# Patient Record
Sex: MALE | Race: OTHER | HISPANIC OR LATINO | Employment: UNEMPLOYED | URBAN - METROPOLITAN AREA
[De-identification: names, ages, dates, MRNs, and addresses within clinical notes are randomized per-mention and may not be internally consistent; named-entity substitution may affect disease eponyms.]

---

## 2023-10-18 ENCOUNTER — HOSPITAL ENCOUNTER (EMERGENCY)
Facility: HOSPITAL | Age: 48
Discharge: HOME | End: 2023-10-18
Attending: EMERGENCY MEDICINE

## 2023-10-18 VITALS
HEART RATE: 70 BPM | RESPIRATION RATE: 18 BRPM | HEIGHT: 66 IN | WEIGHT: 119.06 LBS | DIASTOLIC BLOOD PRESSURE: 67 MMHG | TEMPERATURE: 99 F | BODY MASS INDEX: 19.13 KG/M2 | OXYGEN SATURATION: 99 % | SYSTOLIC BLOOD PRESSURE: 116 MMHG
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
## 2023-10-18 DIAGNOSIS — S50.01XA CONTUSION OF RIGHT ELBOW, INITIAL ENCOUNTER: Primary | ICD-10-CM

## 2023-10-18 PROCEDURE — 99283 EMERGENCY DEPT VISIT LOW MDM: CPT

## 2023-10-18 RX ORDER — NAPROXEN 500 MG/1
500 TABLET ORAL 2 TIMES DAILY WITH MEALS
Qty: 60 TABLET | Refills: 0 | Status: SHIPPED | OUTPATIENT
Start: 2023-10-18

## 2023-10-18 RX ORDER — NAPROXEN 500 MG/1
500 TABLET ORAL 2 TIMES DAILY WITH MEALS
Qty: 60 TABLET | Refills: 0 | Status: SHIPPED | OUTPATIENT
Start: 2023-10-18 | End: 2023-10-18 | Stop reason: SDUPTHER

## 2023-10-18 NOTE — DISCHARGE INSTRUCTIONS

## 2023-10-18 NOTE — Clinical Note
"Vinnie Davistevin Pedro was seen and treated in our emergency department on 10/18/2023.  He may return to work on 10/20/2023.       If you have any questions or concerns, please don't hesitate to call.      Avila Tyson PA-C"

## 2023-10-18 NOTE — ED PROVIDER NOTES
Encounter Date: 10/18/2023       History     Chief Complaint   Patient presents with    Fall     Pt was coming down stairs and fell landing on his right elbow. Pt has continued with pain. Pt has an elastic wrap in place. Decreased ROM and tender to touch.      48 year old male presents to ED with concern of right elbow pain after he stumbled while walking down stairs causing contusion to lateral right elbow.  Pain to site is sharp, worse with touch or movement, severity rated had 9/10.  No numbness or focal weakness or other injuries reported from fall.  No other acute complaints at this time.    The history is provided by the patient.     Review of patient's allergies indicates:  No Known Allergies  No past medical history on file.  No past surgical history on file.  No family history on file.     Review of Systems   Musculoskeletal:  Positive for arthralgias.   Skin:  Negative for wound.   Neurological:  Negative for weakness and numbness.       Physical Exam     Initial Vitals [10/18/23 1532]   BP Pulse Resp Temp SpO2   116/67 70 18 98.9 °F (37.2 °C) 99 %      MAP       --         Physical Exam    Nursing note and vitals reviewed.  Constitutional: He appears well-developed and well-nourished. He is active. He does not have a sickly appearance. He does not appear ill. No distress.   HENT:   Head: Normocephalic and atraumatic.   Neck:   Normal range of motion.  Musculoskeletal:      Cervical back: Normal range of motion.      Comments: Right elbow tenderness over lateral malleolus.  No obvious physical or palpable deformities.  No significant swelling.  Tenderness does extend to dorsal right forearm.  Pain does worse with wrist extension against resistance.  Radial pulse intact.  Distal sensation intact.     Neurological: He is alert. GCS eye subscore is 4. GCS verbal subscore is 5. GCS motor subscore is 6.   Skin: Skin is warm and dry.   Psychiatric: He has a normal mood and affect. His speech is normal and  behavior is normal.         ED Course   Procedures  Labs Reviewed - No data to display       Imaging Results              X-Ray Forearm Right (Final result)  Result time 10/18/23 16:18:19      Final result by Robin Carpenter MD (10/18/23 16:18:19)                   Impression:      No acute displaced fracture-dislocation identified.      Electronically signed by: Robin Carpenter MD  Date:    10/18/2023  Time:    16:18               Narrative:    EXAMINATION:  XR ELBOW COMPLETE 3 VIEW RIGHT; XR FOREARM RIGHT    CLINICAL HISTORY:  . Unspecified fall, initial encounter    TECHNIQUE:  AP, lateral, and oblique views of the right elbow; AP and lateral views right forearm    COMPARISON:  None    FINDINGS:  Bones are well mineralized.  Slight ulnar minus variance.  No displaced fracture, dislocation or destructive osseous process.  No large elbow joint effusion.  Joint spaces appear relatively maintained.  No subcutaneous emphysema or radiodense retained foreign body.                                       X-Ray Elbow Complete Right (Final result)  Result time 10/18/23 16:18:19      Final result by Robin Carpenter MD (10/18/23 16:18:19)                   Impression:      No acute displaced fracture-dislocation identified.      Electronically signed by: Robin Carpenter MD  Date:    10/18/2023  Time:    16:18               Narrative:    EXAMINATION:  XR ELBOW COMPLETE 3 VIEW RIGHT; XR FOREARM RIGHT    CLINICAL HISTORY:  . Unspecified fall, initial encounter    TECHNIQUE:  AP, lateral, and oblique views of the right elbow; AP and lateral views right forearm    COMPARISON:  None    FINDINGS:  Bones are well mineralized.  Slight ulnar minus variance.  No displaced fracture, dislocation or destructive osseous process.  No large elbow joint effusion.  Joint spaces appear relatively maintained.  No subcutaneous emphysema or radiodense retained foreign body.                                       Medications - No data to display  Medical  Decision Making  Patient presents with concern of right elbow pain after contusion from fall.  No numbness or focal weakness or other injuries from fall.  Afebrile with vitals WNL.  Tenderness noted over lateral malleolus of right elbow with no obvious physical or palpable deformities.  Neurovascular intact.    DDx:  Including but not limited to fall, contusion, strain, sprain, dislocation, fracture    X-ray showing no acute bony abnormalities or dislocation.  Will plan to continue with conservative care.  Ace wrap applied in ED.  Prescription for naproxen.  Encouraged activities and movements only as tolerated with close PCP follow-up.  ED return precautions were discussed.  Patient states his understanding and agrees with plan    Amount and/or Complexity of Data Reviewed  Radiology: ordered and independent interpretation performed.     Details: X-ray right elbow per my interpretation showing no displaced fracture.  X-ray right forearm per my interpretation also showing no acute bleed displaced fracture.  Images were reviewed by Radiology.    Risk  Prescription drug management.                               Clinical Impression:   Final diagnoses:  [W19.XXXA] Fall  [S50.01XA] Contusion of right elbow, initial encounter (Primary)        ED Disposition Condition    Discharge Stable          ED Prescriptions       Medication Sig Dispense Start Date End Date Auth. Provider    naproxen (NAPROSYN) 500 MG tablet  (Status: Discontinued) Take 1 tablet (500 mg total) by mouth 2 (two) times daily with meals. 60 tablet 10/18/2023 10/18/2023 Avila Tyson PA-C    naproxen (NAPROSYN) 500 MG tablet Take 1 tablet (500 mg total) by mouth 2 (two) times daily with meals. 60 tablet 10/18/2023 -- Avila Tyson PA-C          Follow-up Information       Follow up With Specialties Details Why Contact Info    Your Doctor                 Avila Tyson PA-C  10/18/23 [id number]

## 2023-10-18 NOTE — ED NOTES
Pt tripped walking down some stairs and landed on his right elbow. Pt is awake and alert and denies any head injury or trauma. Pt has decreased rom to right elbow and it is tender to touch. +pulse. Plan of care reviewed.